# Patient Record
Sex: MALE | Race: WHITE
[De-identification: names, ages, dates, MRNs, and addresses within clinical notes are randomized per-mention and may not be internally consistent; named-entity substitution may affect disease eponyms.]

---

## 2021-07-13 ENCOUNTER — HOSPITAL ENCOUNTER (EMERGENCY)
Dept: HOSPITAL 49 - FER | Age: 26
LOS: 1 days | Discharge: HOME | End: 2021-07-14
Payer: COMMERCIAL

## 2021-07-13 DIAGNOSIS — Z23: ICD-10-CM
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
## 2021-07-13 DIAGNOSIS — S61.451A: Primary | ICD-10-CM

## 2024-10-14 ENCOUNTER — HOSPITAL ENCOUNTER (OUTPATIENT)
Dept: GENERAL RADIOLOGY | Facility: HOSPITAL | Age: 29
Discharge: HOME | End: 2024-10-14
Payer: COMMERCIAL

## 2024-10-14 ENCOUNTER — OFFICE VISIT (OUTPATIENT)
Dept: FAMILY MEDICINE CLINIC | Age: 29
End: 2024-10-14
Payer: COMMERCIAL

## 2024-10-14 ENCOUNTER — LAB (OUTPATIENT)
Dept: LAB | Facility: HOSPITAL | Age: 29
End: 2024-10-14
Payer: COMMERCIAL

## 2024-10-14 VITALS
WEIGHT: 250 LBS | OXYGEN SATURATION: 97 % | HEART RATE: 67 BPM | HEIGHT: 72 IN | SYSTOLIC BLOOD PRESSURE: 131 MMHG | BODY MASS INDEX: 33.86 KG/M2 | DIASTOLIC BLOOD PRESSURE: 76 MMHG

## 2024-10-14 DIAGNOSIS — M54.50 ACUTE LEFT-SIDED LOW BACK PAIN WITHOUT SCIATICA: ICD-10-CM

## 2024-10-14 DIAGNOSIS — M25.552 LEFT HIP PAIN: ICD-10-CM

## 2024-10-14 DIAGNOSIS — Z11.59 NEED FOR HEPATITIS C SCREENING TEST: ICD-10-CM

## 2024-10-14 DIAGNOSIS — Z79.899 HIGH RISK MEDICATION USE: ICD-10-CM

## 2024-10-14 DIAGNOSIS — Z76.89 ENCOUNTER TO ESTABLISH CARE: Primary | ICD-10-CM

## 2024-10-14 LAB
BACTERIA UR QL AUTO: NORMAL /HPF
BILIRUB UR QL STRIP: NEGATIVE
CLARITY UR: CLEAR
COLOR UR: YELLOW
DEPRECATED RDW RBC AUTO: 39.8 FL (ref 37–54)
ERYTHROCYTE [DISTWIDTH] IN BLOOD BY AUTOMATED COUNT: 12.1 % (ref 12.3–15.4)
GLUCOSE UR STRIP-MCNC: NEGATIVE MG/DL
HCT VFR BLD AUTO: 48.2 % (ref 37.5–51)
HCV AB SER QL: NORMAL
HGB BLD-MCNC: 16.9 G/DL (ref 13–17.7)
HGB UR QL STRIP.AUTO: NEGATIVE
KETONES UR QL STRIP: NEGATIVE
LEUKOCYTE ESTERASE UR QL STRIP.AUTO: NEGATIVE
MCH RBC QN AUTO: 31 PG (ref 26.6–33)
MCHC RBC AUTO-ENTMCNC: 35.1 G/DL (ref 31.5–35.7)
MCV RBC AUTO: 88.3 FL (ref 79–97)
NITRITE UR QL STRIP: NEGATIVE
PH UR STRIP.AUTO: 6 [PH] (ref 5–8)
PLATELET # BLD AUTO: 292 10*3/MM3 (ref 140–450)
PMV BLD AUTO: 9.8 FL (ref 6–12)
PROT UR QL STRIP: NEGATIVE
RBC # BLD AUTO: 5.46 10*6/MM3 (ref 4.14–5.8)
RBC # UR STRIP: NORMAL /HPF
REF LAB TEST METHOD: NORMAL
SP GR UR STRIP: >=1.03 (ref 1–1.03)
SQUAMOUS #/AREA URNS HPF: NORMAL /HPF
UROBILINOGEN UR QL STRIP: NORMAL
WBC # UR STRIP: NORMAL /HPF
WBC NRBC COR # BLD AUTO: 7.07 10*3/MM3 (ref 3.4–10.8)

## 2024-10-14 PROCEDURE — 99203 OFFICE O/P NEW LOW 30 MIN: CPT | Performed by: NURSE PRACTITIONER

## 2024-10-14 PROCEDURE — 85027 COMPLETE CBC AUTOMATED: CPT

## 2024-10-14 PROCEDURE — 73502 X-RAY EXAM HIP UNI 2-3 VIEWS: CPT

## 2024-10-14 PROCEDURE — 81001 URINALYSIS AUTO W/SCOPE: CPT

## 2024-10-14 PROCEDURE — 80053 COMPREHEN METABOLIC PANEL: CPT

## 2024-10-14 PROCEDURE — 72110 X-RAY EXAM L-2 SPINE 4/>VWS: CPT

## 2024-10-14 PROCEDURE — 86803 HEPATITIS C AB TEST: CPT

## 2024-10-14 PROCEDURE — 36415 COLL VENOUS BLD VENIPUNCTURE: CPT

## 2024-10-14 RX ORDER — LIDOCAINE 50 MG/G
PATCH TOPICAL
COMMUNITY
Start: 2024-09-16 | End: 2024-10-14

## 2024-10-14 RX ORDER — OFLOXACIN 3 MG/ML
SOLUTION/ DROPS OPHTHALMIC
COMMUNITY
Start: 2024-09-16 | End: 2024-10-14

## 2024-10-14 RX ORDER — METHOCARBAMOL 750 MG/1
TABLET, FILM COATED ORAL
COMMUNITY
Start: 2024-09-16 | End: 2024-10-14

## 2024-10-14 RX ORDER — AMMONIUM LACTATE 12 G/100G
LOTION TOPICAL
COMMUNITY
Start: 2024-10-03 | End: 2024-10-14

## 2024-10-14 RX ORDER — IBUPROFEN 600 MG/1
1 TABLET, FILM COATED ORAL EVERY 6 HOURS
COMMUNITY
Start: 2024-09-16 | End: 2024-10-14

## 2024-10-14 NOTE — PROGRESS NOTES
"Chief Complaint  Establish Care (Blood work/Pt went to [facility] for Toenail Fungus and severed dry feet/Pt was told that he needs a Hepatic panel to check for side effects of a medication/)    Subjective          Colby Mark presents to [facility]  History of Present Illness  He is here to establish care.     He is requesting lab work. He has fungal infection in his toes on his bilateral feet and the podiatrist is wanting him to get his labs checked for liver disease prior to starting medication.    He has lower left sided back pain that's been present for about a month. He reports that the pain is mostly in his left hip. He reports that it is hard to bend over at times. The pain is a constant dullness, but sometimes it can get sharp, especially with laying. When he walks, the pain is better. The pain is worse after he gets up from laying for extended period of time. He went to [facility], who prescribed him ibuprofen, lidocaine patches, and a muscle relaxer.           Objective   Vital Signs:   /76 (BP Location: Left arm, Patient Position: Sitting)   Pulse 67   Ht 182.9 cm (72\")   Wt 113 kg (250 lb)   SpO2 97% Comment: ROOM AIR  BMI 33.91 kg/m²     Physical Exam  Constitutional:       General: He is not in acute distress.     Appearance: Normal appearance. He is normal weight.   HENT:      Head: Normocephalic.   Eyes:      Pupils: Pupils are equal, round, and reactive to light.      Visual Fields: Right eye visual fields normal and left eye visual fields normal.   Neck:      Trachea: Trachea normal.   Cardiovascular:      Rate and Rhythm: Normal rate and regular rhythm.      Heart sounds: Normal heart sounds.   Pulmonary:      Effort: Pulmonary effort is normal.      Breath sounds: Normal breath sounds and air entry.   Abdominal:      Tenderness: There is no right CVA tenderness or left CVA tenderness.   Musculoskeletal:      Right lower leg: No edema.      Left " lower leg: No edema.   Skin:     General: Skin is warm and dry.   Neurological:      Mental Status: He is alert and oriented to person, place, and time.   Psychiatric:         Mood and Affect: Mood and affect normal.         Behavior: Behavior normal.         Thought Content: Thought content normal.        Result Review :   The following data was reviewed by: ABBY Patel on 10/14/2024:                  Assessment and Plan    Diagnoses and all orders for this visit:    1. Encounter to establish care (Primary)    2. High risk medication use  -     CBC (No Diff); Future  -     Comprehensive Metabolic Panel; Future    3. Need for hepatitis C screening test  -     Hepatitis C Antibody; Future    4. Left hip pain  -     XR Hip With or Without Pelvis 2 - 3 View Left; Future  -     Urinalysis With Microscopic - Urine, Clean Catch; Future    5. Acute left-sided low back pain without sciatica  -     XR Spine Lumbar 4+ View; Future  -     Urinalysis With Microscopic - Urine, Clean Catch; Future      Will check labs today.  Will also check an x-ray of his lumbar spine in back.      BMI is >= 30 and <35. (Class 1 Obesity). The following options were offered after discussion;: exercise counseling/recommendations and nutrition counseling/recommendations       Follow Up   Return if symptoms worsen or fail to improve, for Pending test results.  Patient was given instructions and counseling regarding his condition or for health maintenance advice. Please see specific information pulled into the AVS if appropriate.

## 2024-10-15 ENCOUNTER — TELEPHONE (OUTPATIENT)
Dept: FAMILY MEDICINE CLINIC | Age: 29
End: 2024-10-15
Payer: COMMERCIAL

## 2024-10-15 DIAGNOSIS — R93.7 ABNORMAL X-RAY OF BONE: ICD-10-CM

## 2024-10-15 DIAGNOSIS — M25.552 LEFT HIP PAIN: ICD-10-CM

## 2024-10-15 DIAGNOSIS — R74.8 ELEVATED LIVER ENZYMES: Primary | ICD-10-CM

## 2024-10-15 LAB
ALBUMIN SERPL-MCNC: 4.4 G/DL (ref 3.5–5.2)
ALBUMIN/GLOB SERPL: 1.8 G/DL
ALP SERPL-CCNC: 88 U/L (ref 39–117)
ALT SERPL W P-5'-P-CCNC: 146 U/L (ref 1–41)
ANION GAP SERPL CALCULATED.3IONS-SCNC: 8.2 MMOL/L (ref 5–15)
AST SERPL-CCNC: 49 U/L (ref 1–40)
BILIRUB SERPL-MCNC: 0.5 MG/DL (ref 0–1.2)
BUN SERPL-MCNC: 14 MG/DL (ref 6–20)
BUN/CREAT SERPL: 13 (ref 7–25)
CALCIUM SPEC-SCNC: 9.5 MG/DL (ref 8.6–10.5)
CHLORIDE SERPL-SCNC: 105 MMOL/L (ref 98–107)
CO2 SERPL-SCNC: 26.8 MMOL/L (ref 22–29)
CREAT SERPL-MCNC: 1.08 MG/DL (ref 0.76–1.27)
EGFRCR SERPLBLD CKD-EPI 2021: 96.5 ML/MIN/1.73
GLOBULIN UR ELPH-MCNC: 2.5 GM/DL
GLUCOSE SERPL-MCNC: 91 MG/DL (ref 65–99)
POTASSIUM SERPL-SCNC: 4.1 MMOL/L (ref 3.5–5.2)
PROT SERPL-MCNC: 6.9 G/DL (ref 6–8.5)
SODIUM SERPL-SCNC: 140 MMOL/L (ref 136–145)

## 2024-10-18 ENCOUNTER — PATIENT ROUNDING (BHMG ONLY) (OUTPATIENT)
Dept: FAMILY MEDICINE CLINIC | Age: 29
End: 2024-10-18
Payer: COMMERCIAL

## 2024-10-18 NOTE — PROGRESS NOTES
October 18, 2024    Hello, may I speak with Colby Mark?    My name is Sandy      I am  with [facility]  [address] [facility] [address] [facility] [address]  [facility] [address] [address] [phone].    Before we get started may I verify your date of birth? 1995    I am calling to officially welcome you to our practice and ask about your recent visit. Is this a good time to talk?  YES  Tell me about your visit with us. What things went well?  Great visit. She was great.       We're always looking for ways to make our patients' experiences even better. Do you have recommendations on ways we may improve?  NO    Overall were you satisfied with your first visit to our practice? YES       I appreciate you taking the time to speak with me today. Is there anything else I can do for you? NO      Thank you, and have a great day.

## 2024-10-30 ENCOUNTER — OFFICE VISIT (OUTPATIENT)
Dept: ORTHOPEDIC SURGERY | Facility: CLINIC | Age: 29
End: 2024-10-30
Payer: COMMERCIAL

## 2024-10-30 VITALS
SYSTOLIC BLOOD PRESSURE: 119 MMHG | HEART RATE: 71 BPM | OXYGEN SATURATION: 97 % | BODY MASS INDEX: 33.86 KG/M2 | WEIGHT: 250 LBS | DIASTOLIC BLOOD PRESSURE: 82 MMHG | HEIGHT: 72 IN

## 2024-10-30 DIAGNOSIS — M76.892 HIP FLEXOR TENDONITIS, LEFT: Primary | ICD-10-CM

## 2024-10-30 RX ORDER — DICLOFENAC SODIUM 75 MG/1
75 TABLET, DELAYED RELEASE ORAL 2 TIMES DAILY
Qty: 60 TABLET | Refills: 1 | Status: SHIPPED | OUTPATIENT
Start: 2024-10-30

## 2024-10-30 NOTE — PROGRESS NOTES
"Chief Complaint  Initial Evaluation of the Left Hip     Subjective      Colby Mark presents to [facility] for initial evaluation of the left hip. He has pain in the groin.  He had X rays on 10/14/24.  He has only had pain for a couple of weeks.  He has pain with sleeping.  He does fine with moving and walking.      No Known Allergies     Social History     Socioeconomic History    Marital status: Single   Tobacco Use    Smoking status: Never    Smokeless tobacco: Never   Vaping Use    Vaping status: Never Used   Substance and Sexual Activity    Alcohol use: Not Currently    Drug use: Never    Sexual activity: Defer        I reviewed the patient's chief complaint, history of present illness, review of systems, past medical history, surgical history, family history, social history, medications, and allergy list.     Review of Systems     Constitutional: Denies fevers, chills, weight loss  Cardiovascular: Denies chest pain, shortness of breath  Skin: Denies rashes, acute skin changes  Neurologic: Denies headache, loss of consciousness        Vital Signs:   /82   Pulse 71   Ht 182.9 cm (72\")   Wt 113 kg (250 lb)   SpO2 97%   BMI 33.91 kg/m²          Physical Exam  General: Alert. No acute distress    Ortho Exam        LEFT HIP Full ROM of the hip. No skin discoloration, atrophy or swelling. Positive EHL, FHL, GS, and TA. Sensation intact to all 5 nerves of the foot. Negative straight leg raise. Leg lengths appear equal. Ambulates with Non-antalgic gait Negative Saima. Negative Chadd. Good strength to hamstrings, quadriceps, dorsiflexors, and plantar flexors. Knee Extensor Mechanism  intact        Procedures        Imaging Results (Most Recent)       None             Result Review :         XR Hip With or Without Pelvis 2 - 3 View Left    Result Date: 10/15/2024  Narrative: XR HIP W OR WO PELVIS 2-3 VIEW LEFT Date of Exam: 10/14/2024 10:55 AM EDT Indication: left hip pain for " 1 month. Comparison: None available. Findings: AP pelvis and 2 views of the left hip. No fracture or dislocation. No bone erosion or destruction. No sacroiliac joint diastasis. No evidence of osteonecrosis in the femoral heads. There is a well-corticated calcification along the superior lateral aspect of the left acetabulum that could be related to remote injury.     Impression: Calcification along the superior lateral left acetabulum could be related to remote injury. Examination is otherwise within normal limits. MRI may be beneficial for follow-up if indicated. Electronically Signed: Brian Chappell MD  10/15/2024 6:09 AM EDT  Workstation ID: [id number]    XR Spine Lumbar Complete 4+VW    Result Date: 10/15/2024  Narrative: XR SPINE LUMBAR COMPLETE 4+VW Date of Exam: 10/14/2024 10:55 AM EDT Indication: low back pain and left radiculopathy for 1 month. Comparison: None available. Findings: Alignment is normal with no subluxation identified. Oblique views demonstrate no pars defects. Vertebral body heights and disc spaces are maintained. No bone erosion or destruction. There is mild chronic wedging at T12. No acute fractures.     Impression: Lumbar spine is negative. There is mild chronic wedging at T12. Electronically Signed: Brian Chappell MD  10/15/2024 6:07 AM EDT  Workstation ID: [id number]            Assessment and Plan     Diagnoses and all orders for this visit:    1. Hip flexor tendonitis, left (Primary)        Discussed the treatment plan with the patient. I reviewed the X-rays that were obtained 10/14/24 with the patient.     Prescribed anti inflammatory.   He can call back for physical therapy.      Call or return if worsening symptoms.    Follow Up     PRN      Patient was given instructions and counseling regarding his condition or for health maintenance advice. Please see specific information pulled into the AVS if appropriate.     Scribed for Marcial Tolentino MD by Jazmin Adler MA.  10/30/24   10:28  EDT    I have personally performed the services described in this document as scribed by the above individual and it is both accurate and complete. Marcial Tolentino MD 10/30/24

## 2024-11-05 DIAGNOSIS — R93.7 ABNORMAL X-RAY OF BONE: Primary | ICD-10-CM

## 2024-11-05 DIAGNOSIS — R93.7 ABNORMAL X-RAY OF THORACIC SPINE: ICD-10-CM

## 2024-11-22 ENCOUNTER — OFFICE VISIT (OUTPATIENT)
Dept: NEUROSURGERY | Facility: CLINIC | Age: 29
End: 2024-11-22
Payer: COMMERCIAL

## 2024-11-22 VITALS
SYSTOLIC BLOOD PRESSURE: 130 MMHG | OXYGEN SATURATION: 98 % | HEART RATE: 69 BPM | DIASTOLIC BLOOD PRESSURE: 82 MMHG | BODY MASS INDEX: 34.13 KG/M2 | HEIGHT: 72 IN | WEIGHT: 252 LBS

## 2024-11-22 DIAGNOSIS — M43.9 WEDGE DEFORMITY ON X-RAY OF SPINE: ICD-10-CM

## 2024-11-22 DIAGNOSIS — G89.29 CHRONIC LEFT-SIDED LOW BACK PAIN WITHOUT SCIATICA: ICD-10-CM

## 2024-11-22 DIAGNOSIS — M54.50 CHRONIC LEFT-SIDED LOW BACK PAIN WITHOUT SCIATICA: ICD-10-CM

## 2024-11-22 DIAGNOSIS — M51.379 DEGENERATION OF INTERVERTEBRAL DISC AT L5-S1 LEVEL: Primary | ICD-10-CM

## 2024-11-22 DIAGNOSIS — M43.17 SPONDYLOLISTHESIS AT L5-S1 LEVEL: ICD-10-CM

## 2024-11-22 NOTE — PROGRESS NOTES
"Chief Complaint  Establish Care (Abnormal x-ray of bone, Abnormal x-ray of thoracic spine, XR LUMBAR 10/14/24 @ [facility])    Subjective          Colby Mark who is a 29 y.o. year old male who presents to [facility] for Evaluation of the Spine.     The patient complains of pain located in the Lumbar Spine.  Patients states the pain has been present for 2 weeks.  The pain came on acutely.  The pain scaled level is 2.  The pain  extends to the left lower back and hip joint area .  The pain is constant and waxing/waning and described as sharp, dull, and aching.  The pain is worse in the morning. Patient states  being up on his feet, icy hot and lidocaine patches makes the pain better.  Patient states  laying down makes the pain worse.    Associated Symptoms Include: Denies Numbness, Tingling, Weakness, and Loss of Bowel or Bladder Control  Conservative Interventions Include:  Icy hot and lidocaine patches, NSAID were not very effective.    Was this the result of an injury or accident? : No    History of Previous Spinal Surgery?: No     reports that he has never smoked. He has never been exposed to tobacco smoke. He has never used smokeless tobacco.    Review of Systems   Musculoskeletal:  Positive for arthralgias (left hip) and back pain.        Objective   Vital Signs:   /82 (BP Location: Right arm, Patient Position: Sitting, Cuff Size: Large Adult)   Pulse 69   Ht 182.9 cm (72.01\")   Wt 114 kg (252 lb)   SpO2 98%   BMI 34.17 kg/m²       Physical Exam  Constitutional:       Appearance: Normal appearance. He is obese.   Pulmonary:      Effort: Pulmonary effort is normal.   Musculoskeletal:         General: No tenderness (\"pressure\" when pushing in the midline and left lumbar area).      Comments: SLR on the left worsens pain to the left back/hip   Neurological:      General: No focal deficit present.      Mental Status: He is alert and oriented to person, place, " and time.      Sensory: No sensory deficit.      Motor: No weakness.      Deep Tendon Reflexes: Reflexes normal.   Psychiatric:         Mood and Affect: Mood normal.         Behavior: Behavior normal.        Neurological Exam  Mental Status  Alert. Oriented to person, place, and time.      Result Review     I have personally interpreted the x-ray of the lumbar spine from 10/14/2024 which shows mild wedging of the T12 vertebrae.  There is an anterior osteophyte which is disconnected from the T12 bone.  There is minimal retrolisthesis of L5 on S1.  There is some minimal disc space narrowing at L5-S1.     Assessment and Plan    Diagnoses and all orders for this visit:    1. Degeneration of intervertebral disc at L5-S1 level (Primary)  -     Ambulatory Referral to Physical Therapy for Evaluation & Treatment    2. Spondylolisthesis at L5-S1 level  -     Ambulatory Referral to Physical Therapy for Evaluation & Treatment    3. 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 Chronic left-sided low back pain without sciatica  -     Ambulatory Referral to Physical Therapy for Evaluation & Treatment    He has pain in the left lower back and hip joint area.    He has seen Orthopedics who were not convinced his pain was related to the hip.    He has had an x-ray of the lumbar spine which shows mild wedging of the T12 vertebrae. I expect this is an old injury which would not account for his pain today.    There is some disc space narrowing at L5-S1, suggestive of degenerative disc disease, as well as some minimal retrolisthesis of L5 on S1. We discussed that if he develops pain in an S1 pattern, or L5 pattern, then I would consider evaluate these changes further.    He will trial some physical therapy for the lumbar spine to assess benefit. If he sees no significant improvement with PT, I would consider an MRI of the lumbar spine to evaluate further.    We also discussed conservatively considering chiropractic care.    The patient was  counseled on basic recommendations for the reduction and prevention of back, neck, or spine pain in association with spinal disorders, including: cessation/avoidance of nicotine use, maintenance of a healthy BMI and weight, focusing on building/maintaining core strength through core exercise, and avoidance of activities which worsen the pain. The patient will monitor for changes in symptoms and notify our clinic of these changes as needed.    Follow Up   Return if symptoms worsen or fail to improve.  Patient was given instructions and counseling regarding his condition or for health maintenance advice. Please see specific information pulled into the AVS if appropriate.

## 2024-11-25 ENCOUNTER — PATIENT ROUNDING (BHMG ONLY) (OUTPATIENT)
Dept: NEUROSURGERY | Facility: CLINIC | Age: 29
End: 2024-11-25
Payer: COMMERCIAL